# Patient Record
Sex: MALE | Race: WHITE | NOT HISPANIC OR LATINO | Employment: FULL TIME | URBAN - METROPOLITAN AREA
[De-identification: names, ages, dates, MRNs, and addresses within clinical notes are randomized per-mention and may not be internally consistent; named-entity substitution may affect disease eponyms.]

---

## 2017-08-01 ENCOUNTER — TELEPHONE (OUTPATIENT)
Dept: INTERNAL MEDICINE | Facility: CLINIC | Age: 42
End: 2017-08-01

## 2017-08-01 DIAGNOSIS — Z02.1 PHYSICAL EXAM, PRE-EMPLOYMENT: Primary | ICD-10-CM

## 2017-08-01 NOTE — TELEPHONE ENCOUNTER
----- Message from Gisselle Chase sent at 8/1/2017  2:45 PM CDT -----  Regarding: Orders Need to be put in using [facility]  Contact: [phone]  Please put in orders for lab, ekg, audio for this patient using the [employer] [facility].  I can link the orders please let me know when they are done.    Thank you  Gisselle ALBERTO [id number]

## 2017-08-24 ENCOUNTER — CLINICAL SUPPORT (OUTPATIENT)
Dept: INTERNAL MEDICINE | Facility: CLINIC | Age: 42
End: 2017-08-24

## 2017-08-24 ENCOUNTER — CLINICAL SUPPORT (OUTPATIENT)
Dept: CARDIOLOGY | Facility: CLINIC | Age: 42
End: 2017-08-24

## 2017-08-24 ENCOUNTER — CLINICAL SUPPORT (OUTPATIENT)
Dept: AUDIOLOGY | Facility: CLINIC | Age: 42
End: 2017-08-24

## 2017-08-24 VITALS
HEART RATE: 55 BPM | OXYGEN SATURATION: 97 % | HEIGHT: 70 IN | TEMPERATURE: 98 F | WEIGHT: 213.38 LBS | BODY MASS INDEX: 30.55 KG/M2 | SYSTOLIC BLOOD PRESSURE: 100 MMHG | RESPIRATION RATE: 16 BRPM | DIASTOLIC BLOOD PRESSURE: 72 MMHG

## 2017-08-24 DIAGNOSIS — Z02.89 ENCOUNTER FOR OCCUPATIONAL PHYSICAL EXAMINATION: Primary | ICD-10-CM

## 2017-08-24 DIAGNOSIS — Z01.12 HEARING CONSERVATION AND TREATMENT EXAM: Primary | ICD-10-CM

## 2017-08-24 DIAGNOSIS — Z02.1 PHYSICAL EXAM, PRE-EMPLOYMENT: ICD-10-CM

## 2017-08-24 PROCEDURE — 93000 ELECTROCARDIOGRAM COMPLETE: CPT | Mod: S$GLB,,, | Performed by: INTERNAL MEDICINE

## 2017-08-24 PROCEDURE — 99213 OFFICE O/P EST LOW 20 MIN: CPT | Mod: ,,, | Performed by: PHYSICIAN ASSISTANT

## 2017-08-24 PROCEDURE — 92552 PURE TONE AUDIOMETRY AIR: CPT | Mod: ,,, | Performed by: AUDIOLOGIST

## 2017-08-24 NOTE — PROGRESS NOTES
Executive Health Screening    Jeramy Vargas was seen 08/24/2017 for an executive health hearing screen.  Results revealed normal hearing sensitivity 250-8000 Hz, bilaterally.  Otoscopy was within normal limits, bilaterally.    Recommendations:  1. Wear Hearing Protective Devices around loud noises

## 2017-08-24 NOTE — PROGRESS NOTES
Subjective:       Patient ID: Jeramy Vargas is a 41 y.o.w/ male.    Chief Complaint: [facility]    HPI         He comes in by himself today and has the above need.  [facility] sent him down for a physical exam.  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  Pre-exam labs, EKG, visual test were done prior to this visit.  All the results are not back at the time of this examination.  He's doing fine and has no medical or physical problems today.  Somehow there was no physical form provided to be filled out.  This is unusual for a [employer] form.    Review of Systems   Constitutional: Negative.  Negative for appetite change, chills, diaphoresis, fatigue, fever and unexpected weight change.   HENT: Negative.  Negative for congestion, dental problem, ear pain, hearing loss, mouth sores, nosebleeds, postnasal drip, rhinorrhea, sinus pressure, sneezing, sore throat, tinnitus and voice change.    Eyes: Negative.  Negative for photophobia and visual disturbance.   Respiratory: Negative.  Negative for apnea, cough, chest tightness, shortness of breath and wheezing.    Cardiovascular: Negative.  Negative for chest pain, palpitations and leg swelling.   Gastrointestinal: Negative.  Negative for abdominal pain, blood in stool, constipation, diarrhea, nausea and vomiting.   Endocrine: Negative.  Negative for cold intolerance, heat intolerance and polyuria.   Genitourinary: Negative.  Negative for difficulty urinating, dysuria, enuresis, frequency, hematuria, scrotal swelling, testicular pain and urgency.   Musculoskeletal: Negative.  Negative for arthralgias, back pain and myalgias.   Skin: Negative.  Negative for pallor and rash.   Allergic/Immunologic: Negative.  Negative for environmental allergies, food allergies and immunocompromised state.   Neurological: Negative.  Negative for dizziness, tremors,  seizures, syncope, weakness, light-headedness, numbness and headaches.   Hematological: Negative.  Negative for adenopathy. Does not bruise/bleed easily.   Psychiatric/Behavioral: Negative.  Negative for behavioral problems, confusion, decreased concentration, dysphoric mood, sleep disturbance and suicidal ideas. The patient is not nervous/anxious.        Objective:      Physical Exam   Constitutional: He is oriented to person, place, and time. He appears well-developed and well-nourished.   HENT:   Head: Normocephalic and atraumatic.   Right Ear: External ear normal.   Left Ear: External ear normal.   Nose: Nose normal.   Mouth/Throat: Oropharynx is clear and moist. No oropharyngeal exudate.   Eyes: Conjunctivae and EOM are normal. Pupils are equal, round, and reactive to light. No scleral icterus.   Neck: Normal range of motion. Neck supple. No JVD present. No tracheal deviation present. No thyromegaly present.   Cardiovascular: Normal rate, regular rhythm, normal heart sounds and intact distal pulses.  Exam reveals no gallop and no friction rub.    No murmur heard.  Pulmonary/Chest: Effort normal and breath sounds normal. No respiratory distress. He has no wheezes. He has no rales. He exhibits no tenderness.   Abdominal: Soft. Bowel sounds are normal. He exhibits no mass. There is no tenderness. There is no rebound and no guarding.   Genitourinary: Penis normal. No penile tenderness.   Genitourinary Comments: Scrotum and testicles were normal with palpation.  He was taught self testicular examination today and should do this once per month.  No inguinal hernias are present.   Musculoskeletal: Normal range of motion. He exhibits no edema or tenderness.   Lymphadenopathy:     He has no cervical adenopathy.   Neurological: He is alert and oriented to person, place, and time. He has normal reflexes. He displays normal reflexes. No cranial nerve deficit. Coordination normal.   Skin: Skin is warm and dry. No rash  noted. He is not diaphoretic. No erythema.   Psychiatric: He has a normal mood and affect. His behavior is normal. Judgment and thought content normal.   Nursing note and vitals reviewed.      Assessment:       1. Encounter for occupational physical examination        Plan:     1.  So far his EKG and his visual acuity and his hearing test were all normal.  Labs are not back at time of dictation.  He did fill out his historical form and is located elsewhere in the EMR.

## 2018-08-14 ENCOUNTER — PATIENT OUTREACH (OUTPATIENT)
Dept: ADMINISTRATIVE | Facility: HOSPITAL | Age: 43
End: 2018-08-14

## 2018-08-14 ENCOUNTER — TELEPHONE (OUTPATIENT)
Dept: INTERNAL MEDICINE | Facility: CLINIC | Age: 43
End: 2018-08-14

## 2018-08-14 DIAGNOSIS — Z02.1 PHYSICAL EXAM, PRE-EMPLOYMENT: Primary | ICD-10-CM

## 2018-08-14 NOTE — TELEPHONE ENCOUNTER
----- Message from Tory Gregorio sent at 8/14/2018 11:13 AM CDT -----  Contact: Please place orders using [facility] Smart Sets  Please place the orders using the [facility] smart sets [facility].    Fasting Lab/send out     Thanks,  Tory [phone]

## 2018-08-27 ENCOUNTER — OFFICE VISIT (OUTPATIENT)
Dept: INTERNAL MEDICINE | Facility: CLINIC | Age: 43
End: 2018-08-27

## 2018-08-27 VITALS
DIASTOLIC BLOOD PRESSURE: 82 MMHG | WEIGHT: 226.88 LBS | SYSTOLIC BLOOD PRESSURE: 112 MMHG | BODY MASS INDEX: 34.38 KG/M2 | OXYGEN SATURATION: 97 % | HEART RATE: 84 BPM | HEIGHT: 68 IN | RESPIRATION RATE: 16 BRPM | TEMPERATURE: 98 F

## 2018-08-27 DIAGNOSIS — Z02.89 PHYSICAL EXAMINATION OF EMPLOYEE: ICD-10-CM

## 2018-08-27 PROCEDURE — 99213 OFFICE O/P EST LOW 20 MIN: CPT | Mod: ,,, | Performed by: PHYSICIAN ASSISTANT

## 2018-08-27 NOTE — PROGRESS NOTES
Subjective:       Patient ID: Jeramy Vargas is a 42 y.o.W/ male.    Chief Complaint: Employment Physical    HPI         He comes in by himself today and has the above need.  This appointment was arranged through [facility].  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  He came in with a 6 page IRS form the need to be filled out.  He has no problems today.    Review of Systems   An IRS history and physical form provided by the patient was filled out in its entirety and a copy was made for the EMR.     Objective:      Physical Exam    An IRS history and physical form provided by the patient was filled out in its entirety and a copy was made for the EMR.    Assessment:       1. Physical examination of employee        Plan:     1.  He did have lab work done prior to his visit here with me.  A copy of his form is located elsewhere in the EMR.  There is no need for any interventional changes to his health lifestyle.

## 2018-08-28 ENCOUNTER — TELEPHONE (OUTPATIENT)
Dept: INTERNAL MEDICINE | Facility: CLINIC | Age: 43
End: 2018-08-28

## 2018-08-28 NOTE — TELEPHONE ENCOUNTER
----- Message from Nubia Renteria sent at 8/28/2018  2:41 PM CDT -----  Contact: [facility]  She's calling to get pt's physical report, please advise [phone] ext [phone]